# Patient Record
Sex: FEMALE | Race: BLACK OR AFRICAN AMERICAN | URBAN - METROPOLITAN AREA
[De-identification: names, ages, dates, MRNs, and addresses within clinical notes are randomized per-mention and may not be internally consistent; named-entity substitution may affect disease eponyms.]

---

## 2022-08-01 ENCOUNTER — OFFICE VISIT (OUTPATIENT)
Dept: URBAN - METROPOLITAN AREA CLINIC 90 | Facility: CLINIC | Age: 52
End: 2022-08-01
Payer: COMMERCIAL

## 2022-08-01 DIAGNOSIS — H40.013 OPEN ANGLE WITH BORDERLINE FINDINGS, LOW RISK, BILATERAL: ICD-10-CM

## 2022-08-01 DIAGNOSIS — H52.4 PRESBYOPIA: ICD-10-CM

## 2022-08-01 DIAGNOSIS — H02.409 PTOSIS OF EYELID: Primary | ICD-10-CM

## 2022-08-01 PROCEDURE — 99204 OFFICE O/P NEW MOD 45 MIN: CPT | Performed by: OPHTHALMOLOGY

## 2022-08-01 ASSESSMENT — INTRAOCULAR PRESSURE
OS: 18
OD: 20

## 2022-08-01 NOTE — IMPRESSION/PLAN
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 Plan: Ptosis RUL stable, no treatment needed. Will continue to monitor.

## 2022-08-01 NOTE — IMPRESSION/PLAN
Impression: Presbyopia: H52.4. Plan: Recommend OTC readers +2.50 OU. Parm[address]  [id number]  [phone]               Thank you for choosing us for your health care visit with Tyson Sorenson MD.  We are glad to serve you and happy to provide you with this [employer] insurance company's guidelines for prior authorization for this test.  You may be held responsible for payment in full if proper authorization is not acquired.   Please contact the Patient Business Office at [phone] if you have any questions related to

## 2022-08-01 NOTE — IMPRESSION/PLAN
Impression: Open angle with borderline findings, low risk, bilateral: H40.013. Plan: IOP's stable, will continue to monitor without gtts at this time. 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 I have stressed importance of patient compliance with follow-up appointments and using glaucoma drops as directed. I have explained the risk of irreversible vision loss and possible blindness associated with glaucoma. Will continue to monitor patient's glaucoma status with OCTg, visual field testing and fundus photography.